# Patient Record
Sex: FEMALE | Race: WHITE
[De-identification: names, ages, dates, MRNs, and addresses within clinical notes are randomized per-mention and may not be internally consistent; named-entity substitution may affect disease eponyms.]

---

## 2020-07-01 ENCOUNTER — HOSPITAL ENCOUNTER (OUTPATIENT)
Dept: HOSPITAL 61 - LAB | Age: 30
Discharge: HOME | End: 2020-07-01
Attending: OBSTETRICS & GYNECOLOGY
Payer: MEDICARE

## 2020-07-01 DIAGNOSIS — Z34.92: Primary | ICD-10-CM

## 2020-07-01 LAB
ERYTHROCYTE [DISTWIDTH] IN BLOOD BY AUTOMATED COUNT: 14.6 % (ref 11.5–14.5)
HCT VFR BLD CALC: 33.6 % (ref 36–47)
HGB BLD-MCNC: 11.5 G/DL (ref 12–15.5)
MCH RBC QN AUTO: 30 PG (ref 25–35)
MCHC RBC AUTO-ENTMCNC: 34 G/DL (ref 31–37)
MCV RBC AUTO: 88 FL (ref 79–100)
PLATELET # BLD AUTO: 338 X10^3/UL (ref 140–400)
RBC # BLD AUTO: 3.84 X10^6/UL (ref 3.5–5.4)
WBC # BLD AUTO: 16.8 X10^3/UL (ref 4–11)

## 2020-07-01 PROCEDURE — 82950 GLUCOSE TEST: CPT

## 2020-07-01 PROCEDURE — 36415 COLL VENOUS BLD VENIPUNCTURE: CPT

## 2020-07-01 PROCEDURE — 85027 COMPLETE CBC AUTOMATED: CPT

## 2020-07-13 ENCOUNTER — HOSPITAL ENCOUNTER (OUTPATIENT)
Dept: HOSPITAL 61 - US | Age: 30
Discharge: HOME | End: 2020-07-13
Attending: OBSTETRICS & GYNECOLOGY
Payer: MEDICARE

## 2020-07-13 DIAGNOSIS — Z34.92: Primary | ICD-10-CM

## 2020-07-13 DIAGNOSIS — Z3A.27: ICD-10-CM

## 2020-07-13 PROCEDURE — 76805 OB US >/= 14 WKS SNGL FETUS: CPT
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
## 2020-09-24 ENCOUNTER — HOSPITAL ENCOUNTER (OUTPATIENT)
Dept: HOSPITAL 61 - 3 SO LND | Age: 30
Setting detail: OBSERVATION
Discharge: HOME | End: 2020-09-24
Attending: OBSTETRICS & GYNECOLOGY | Admitting: OBSTETRICS & GYNECOLOGY
Payer: MEDICARE

## 2020-09-24 DIAGNOSIS — Z3A.36: ICD-10-CM

## 2020-09-24 LAB
ALBUMIN SERPL-MCNC: 2.6 G/DL (ref 3.4–5)
ALBUMIN/GLOB SERPL: 0.7 {RATIO} (ref 1–1.7)
ALP SERPL-CCNC: 114 U/L (ref 46–116)
ALT SERPL-CCNC: 21 U/L (ref 14–59)
ANION GAP SERPL CALC-SCNC: 10 MMOL/L (ref 6–14)
AST SERPL-CCNC: 19 U/L (ref 15–37)
BILIRUB SERPL-MCNC: 0.1 MG/DL (ref 0.2–1)
BUN SERPL-MCNC: 6 MG/DL (ref 7–20)
BUN/CREAT SERPL: 10 (ref 6–20)
CALCIUM SERPL-MCNC: 9.6 MG/DL (ref 8.5–10.1)
CHLORIDE SERPL-SCNC: 104 MMOL/L (ref 98–107)
CO2 SERPL-SCNC: 22 MMOL/L (ref 21–32)
CREAT SERPL-MCNC: 0.6 MG/DL (ref 0.6–1)
ERYTHROCYTE [DISTWIDTH] IN BLOOD BY AUTOMATED COUNT: 15.6 % (ref 11.5–14.5)
GFR SERPLBLD BASED ON 1.73 SQ M-ARVRAT: 115.9 ML/MIN
GLOBULIN SER-MCNC: 3.9 G/DL (ref 2.2–3.8)
GLUCOSE SERPL-MCNC: 87 MG/DL (ref 70–99)
HCT VFR BLD CALC: 34.3 % (ref 36–47)
HGB BLD-MCNC: 11.4 G/DL (ref 12–15.5)
LDH SERPL L TO P-CCNC: 118 U/L (ref 81–234)
MCH RBC QN AUTO: 28 PG (ref 25–35)
MCHC RBC AUTO-ENTMCNC: 33 G/DL (ref 31–37)
MCV RBC AUTO: 84 FL (ref 79–100)
PLATELET # BLD AUTO: 325 X10^3/UL (ref 140–400)
POTASSIUM SERPL-SCNC: 3.8 MMOL/L (ref 3.5–5.1)
PROT SERPL-MCNC: 6.5 G/DL (ref 6.4–8.2)
RBC # BLD AUTO: 4.08 X10^6/UL (ref 3.5–5.4)
SODIUM SERPL-SCNC: 136 MMOL/L (ref 136–145)
URATE SERPL-MCNC: 4.4 MG/DL (ref 2.6–6)
WBC # BLD AUTO: 18.7 X10^3/UL (ref 4–11)

## 2020-09-24 PROCEDURE — 84550 ASSAY OF BLOOD/URIC ACID: CPT

## 2020-09-24 PROCEDURE — G0378 HOSPITAL OBSERVATION PER HR: HCPCS

## 2020-09-24 PROCEDURE — 85027 COMPLETE CBC AUTOMATED: CPT

## 2020-09-24 PROCEDURE — 36415 COLL VENOUS BLD VENIPUNCTURE: CPT

## 2020-09-24 PROCEDURE — G0379 DIRECT REFER HOSPITAL OBSERV: HCPCS

## 2020-09-24 PROCEDURE — 83615 LACTATE (LD) (LDH) ENZYME: CPT

## 2020-09-24 PROCEDURE — 80053 COMPREHEN METABOLIC PANEL: CPT

## 2020-10-01 ENCOUNTER — HOSPITAL ENCOUNTER (OUTPATIENT)
Dept: HOSPITAL 61 - 3 SO LND | Age: 30
Setting detail: OBSERVATION
Discharge: HOME | End: 2020-10-01
Attending: FAMILY MEDICINE | Admitting: OBSTETRICS & GYNECOLOGY
Payer: MEDICARE

## 2020-10-01 DIAGNOSIS — Z3A.37: ICD-10-CM

## 2020-10-01 DIAGNOSIS — F32.9: ICD-10-CM

## 2020-10-01 DIAGNOSIS — O99.343: ICD-10-CM

## 2020-10-01 DIAGNOSIS — F41.8: ICD-10-CM

## 2020-10-01 DIAGNOSIS — O26.893: Primary | ICD-10-CM

## 2020-10-01 PROCEDURE — G0379 DIRECT REFER HOSPITAL OBSERV: HCPCS

## 2020-10-01 PROCEDURE — G0378 HOSPITAL OBSERVATION PER HR: HCPCS

## 2020-10-06 ENCOUNTER — HOSPITAL ENCOUNTER (OUTPATIENT)
Dept: HOSPITAL 61 - LAB | Age: 30
End: 2020-10-06
Attending: OBSTETRICS & GYNECOLOGY
Payer: MEDICARE

## 2020-10-06 DIAGNOSIS — Z20.828: Primary | ICD-10-CM

## 2020-10-09 ENCOUNTER — HOSPITAL ENCOUNTER (INPATIENT)
Dept: HOSPITAL 61 - 3 SO LND | Age: 30
LOS: 2 days | Discharge: HOME | End: 2020-10-11
Attending: OBSTETRICS & GYNECOLOGY | Admitting: OBSTETRICS & GYNECOLOGY
Payer: MEDICARE

## 2020-10-09 VITALS — SYSTOLIC BLOOD PRESSURE: 133 MMHG | DIASTOLIC BLOOD PRESSURE: 75 MMHG

## 2020-10-09 VITALS — DIASTOLIC BLOOD PRESSURE: 71 MMHG | SYSTOLIC BLOOD PRESSURE: 139 MMHG

## 2020-10-09 VITALS — DIASTOLIC BLOOD PRESSURE: 68 MMHG | SYSTOLIC BLOOD PRESSURE: 116 MMHG

## 2020-10-09 VITALS — DIASTOLIC BLOOD PRESSURE: 71 MMHG | SYSTOLIC BLOOD PRESSURE: 122 MMHG

## 2020-10-09 VITALS — BODY MASS INDEX: 34.51 KG/M2 | HEIGHT: 69 IN | WEIGHT: 233 LBS

## 2020-10-09 VITALS — SYSTOLIC BLOOD PRESSURE: 111 MMHG | DIASTOLIC BLOOD PRESSURE: 66 MMHG

## 2020-10-09 VITALS — SYSTOLIC BLOOD PRESSURE: 138 MMHG | DIASTOLIC BLOOD PRESSURE: 75 MMHG

## 2020-10-09 DIAGNOSIS — O34.211: ICD-10-CM

## 2020-10-09 DIAGNOSIS — F32.9: ICD-10-CM

## 2020-10-09 DIAGNOSIS — D64.9: ICD-10-CM

## 2020-10-09 DIAGNOSIS — G43.909: ICD-10-CM

## 2020-10-09 DIAGNOSIS — F17.200: ICD-10-CM

## 2020-10-09 DIAGNOSIS — Z3A.39: ICD-10-CM

## 2020-10-09 DIAGNOSIS — Z82.49: ICD-10-CM

## 2020-10-09 DIAGNOSIS — K80.80: ICD-10-CM

## 2020-10-09 DIAGNOSIS — Z80.0: ICD-10-CM

## 2020-10-09 DIAGNOSIS — J45.909: ICD-10-CM

## 2020-10-09 LAB
APTT PPP: YELLOW S
BACTERIA #/AREA URNS HPF: 0 /HPF
BASOPHILS # BLD AUTO: 0.1 X10^3/UL (ref 0–0.2)
BASOPHILS NFR BLD: 1 % (ref 0–3)
BILIRUB UR QL STRIP: NEGATIVE
EOSINOPHIL NFR BLD: 0.2 X10^3/UL (ref 0–0.7)
EOSINOPHIL NFR BLD: 2 % (ref 0–3)
ERYTHROCYTE [DISTWIDTH] IN BLOOD BY AUTOMATED COUNT: 16.3 % (ref 11.5–14.5)
FIBRINOGEN PPP-MCNC: CLEAR MG/DL
HCT VFR BLD CALC: 34.1 % (ref 36–47)
HGB BLD-MCNC: 11.5 G/DL (ref 12–15.5)
LYMPHOCYTES # BLD: 3.2 X10^3/UL (ref 1–4.8)
LYMPHOCYTES NFR BLD AUTO: 21 % (ref 24–48)
MCH RBC QN AUTO: 28 PG (ref 25–35)
MCHC RBC AUTO-ENTMCNC: 34 G/DL (ref 31–37)
MCV RBC AUTO: 85 FL (ref 79–100)
MONO #: 0.7 X10^3/UL (ref 0–1.1)
MONOCYTES NFR BLD: 5 % (ref 0–9)
NEUT #: 11.2 X10^3/UL (ref 1.8–7.7)
NEUTROPHILS NFR BLD AUTO: 73 % (ref 31–73)
NITRITE UR QL STRIP: NEGATIVE
PH UR STRIP: 7 [PH]
PLATELET # BLD AUTO: 280 X10^3/UL (ref 140–400)
PROT UR STRIP-MCNC: 30 MG/DL
RBC # BLD AUTO: 4.03 X10^6/UL (ref 3.5–5.4)
RBC #/AREA URNS HPF: (no result) /HPF (ref 0–2)
UROBILINOGEN UR-MCNC: 1 MG/DL
WBC # BLD AUTO: 15.5 X10^3/UL (ref 4–11)

## 2020-10-09 PROCEDURE — 86901 BLOOD TYPING SEROLOGIC RH(D): CPT

## 2020-10-09 PROCEDURE — 36415 COLL VENOUS BLD VENIPUNCTURE: CPT

## 2020-10-09 PROCEDURE — 86592 SYPHILIS TEST NON-TREP QUAL: CPT

## 2020-10-09 PROCEDURE — 85027 COMPLETE CBC AUTOMATED: CPT

## 2020-10-09 PROCEDURE — 86900 BLOOD TYPING SEROLOGIC ABO: CPT

## 2020-10-09 PROCEDURE — 86850 RBC ANTIBODY SCREEN: CPT

## 2020-10-09 PROCEDURE — G0378 HOSPITAL OBSERVATION PER HR: HCPCS

## 2020-10-09 PROCEDURE — 81001 URINALYSIS AUTO W/SCOPE: CPT

## 2020-10-09 PROCEDURE — 87086 URINE CULTURE/COLONY COUNT: CPT

## 2020-10-09 PROCEDURE — 85025 COMPLETE CBC W/AUTO DIFF WBC: CPT

## 2020-10-09 RX ADMIN — SODIUM CHLORIDE, SODIUM LACTATE, POTASSIUM CHLORIDE, AND CALCIUM CHLORIDE PRN MLS/HR: .6; .31; .03; .02 INJECTION, SOLUTION INTRAVENOUS at 06:29

## 2020-10-09 RX ADMIN — SODIUM CHLORIDE, SODIUM LACTATE, POTASSIUM CHLORIDE, AND CALCIUM CHLORIDE PRN MLS/HR: .6; .31; .03; .02 INJECTION, SOLUTION INTRAVENOUS at 07:38

## 2020-10-09 RX ADMIN — SODIUM CHLORIDE, SODIUM LACTATE, POTASSIUM CHLORIDE, AND CALCIUM CHLORIDE PRN MLS/HR: .6; .31; .03; .02 INJECTION, SOLUTION INTRAVENOUS at 09:41
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
## 2020-10-10 VITALS — SYSTOLIC BLOOD PRESSURE: 138 MMHG | DIASTOLIC BLOOD PRESSURE: 84 MMHG

## 2020-10-10 VITALS — DIASTOLIC BLOOD PRESSURE: 52 MMHG | SYSTOLIC BLOOD PRESSURE: 109 MMHG

## 2020-10-10 VITALS — DIASTOLIC BLOOD PRESSURE: 70 MMHG | SYSTOLIC BLOOD PRESSURE: 112 MMHG

## 2020-10-10 VITALS — DIASTOLIC BLOOD PRESSURE: 81 MMHG | SYSTOLIC BLOOD PRESSURE: 129 MMHG

## 2020-10-10 LAB
ERYTHROCYTE [DISTWIDTH] IN BLOOD BY AUTOMATED COUNT: 16.5 % (ref 11.5–14.5)
HCT VFR BLD CALC: 26.9 % (ref 36–47)
HGB BLD-MCNC: 8.9 G/DL (ref 12–15.5)
MCH RBC QN AUTO: 28 PG (ref 25–35)
MCHC RBC AUTO-ENTMCNC: 33 G/DL (ref 31–37)
MCV RBC AUTO: 85 FL (ref 79–100)
PLATELET # BLD AUTO: 288 X10^3/UL (ref 140–400)
RBC # BLD AUTO: 3.18 X10^6/UL (ref 3.5–5.4)
WBC # BLD AUTO: 16.3 X10^3/UL (ref 4–11)

## 2020-10-10 RX ADMIN — Medication SCH TAB: at 09:18

## 2020-10-10 RX ADMIN — IBUPROFEN PRN MG: 400 TABLET ORAL at 04:56

## 2020-10-10 RX ADMIN — ACETAMINOPHEN PRN MG: 325 TABLET, FILM COATED ORAL at 21:03

## 2020-10-10 RX ADMIN — IBUPROFEN PRN MG: 400 TABLET ORAL at 23:34

## 2020-10-10 RX ADMIN — IBUPROFEN PRN MG: 400 TABLET ORAL at 14:29

## 2020-10-10 RX ADMIN — DOCUSATE SODIUM PRN MG: 100 CAPSULE, LIQUID FILLED ORAL at 09:18

## 2020-10-10 RX ADMIN — Medication SCH MG: at 17:44

## 2020-10-10 RX ADMIN — Medication SCH MG: at 09:18
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
## 2020-10-11 VITALS
DIASTOLIC BLOOD PRESSURE: 88 MMHG | SYSTOLIC BLOOD PRESSURE: 140 MMHG | SYSTOLIC BLOOD PRESSURE: 140 MMHG | SYSTOLIC BLOOD PRESSURE: 140 MMHG | DIASTOLIC BLOOD PRESSURE: 88 MMHG | DIASTOLIC BLOOD PRESSURE: 88 MMHG

## 2020-10-11 VITALS — SYSTOLIC BLOOD PRESSURE: 141 MMHG | DIASTOLIC BLOOD PRESSURE: 93 MMHG

## 2020-10-11 RX ADMIN — Medication SCH TAB: at 08:29

## 2020-10-11 RX ADMIN — Medication SCH MG: at 08:28

## 2020-10-11 RX ADMIN — DOCUSATE SODIUM PRN MG: 100 CAPSULE, LIQUID FILLED ORAL at 08:28

## 2020-10-11 RX ADMIN — IBUPROFEN PRN MG: 400 TABLET ORAL at 08:28

## 2020-10-11 RX ADMIN — ACETAMINOPHEN PRN MG: 325 TABLET, FILM COATED ORAL at 06:04

## 2020-10-11 RX ADMIN — ACETAMINOPHEN PRN MG: 325 TABLET, FILM COATED ORAL at 14:45
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
## 2021-06-01 ENCOUNTER — HOSPITAL ENCOUNTER (OUTPATIENT)
Dept: HOSPITAL 61 - LAB | Age: 31
End: 2021-06-01
Attending: OBSTETRICS & GYNECOLOGY
Payer: MEDICARE

## 2021-06-01 DIAGNOSIS — Z20.2: Primary | ICD-10-CM

## 2021-06-01 PROCEDURE — 36415 COLL VENOUS BLD VENIPUNCTURE: CPT

## 2021-06-01 PROCEDURE — 86803 HEPATITIS C AB TEST: CPT

## 2021-06-01 PROCEDURE — 86592 SYPHILIS TEST NON-TREP QUAL: CPT

## 2021-06-01 PROCEDURE — 87340 HEPATITIS B SURFACE AG IA: CPT

## 2021-06-01 PROCEDURE — 86703 HIV-1/HIV-2 1 RESULT ANTBDY: CPT

## 2021-11-29 ENCOUNTER — HOSPITAL ENCOUNTER (OUTPATIENT)
Dept: HOSPITAL 61 - LAB | Age: 31
End: 2021-11-29
Attending: OBSTETRICS & GYNECOLOGY
Payer: MEDICARE

## 2021-11-29 DIAGNOSIS — N90.89: Primary | ICD-10-CM

## 2021-11-29 DIAGNOSIS — Z20.6: ICD-10-CM

## 2021-11-29 PROCEDURE — 87340 HEPATITIS B SURFACE AG IA: CPT

## 2021-11-29 PROCEDURE — 86803 HEPATITIS C AB TEST: CPT

## 2021-11-29 PROCEDURE — 36415 COLL VENOUS BLD VENIPUNCTURE: CPT

## 2021-11-29 PROCEDURE — 86703 HIV-1/HIV-2 1 RESULT ANTBDY: CPT

## 2021-11-29 PROCEDURE — 86592 SYPHILIS TEST NON-TREP QUAL: CPT
